# Patient Record
Sex: FEMALE | Employment: FULL TIME | URBAN - METROPOLITAN AREA
[De-identification: names, ages, dates, MRNs, and addresses within clinical notes are randomized per-mention and may not be internally consistent; named-entity substitution may affect disease eponyms.]

---

## 2020-10-28 ENCOUNTER — APPOINTMENT (OUTPATIENT)
Dept: GENERAL RADIOLOGY | Facility: CLINIC | Age: 35
End: 2020-10-28
Payer: OTHER MISCELLANEOUS

## 2020-10-28 ENCOUNTER — HOSPITAL ENCOUNTER (EMERGENCY)
Facility: CLINIC | Age: 35
Discharge: HOME | End: 2020-10-29
Attending: EMERGENCY MEDICINE | Admitting: EMERGENCY MEDICINE
Payer: OTHER MISCELLANEOUS

## 2020-10-28 DIAGNOSIS — S68.119A AMPUTATION OF TIP OF FINGER, INITIAL ENCOUNTER: ICD-10-CM

## 2020-10-28 PROCEDURE — 250N000011 HC RX IP 250 OP 636: Performed by: EMERGENCY MEDICINE

## 2020-10-28 PROCEDURE — 96375 TX/PRO/DX INJ NEW DRUG ADDON: CPT

## 2020-10-28 PROCEDURE — 99285 EMERGENCY DEPT VISIT HI MDM: CPT | Mod: 25

## 2020-10-28 PROCEDURE — 64450 NJX AA&/STRD OTHER PN/BRANCH: CPT | Mod: LT

## 2020-10-28 PROCEDURE — 73140 X-RAY EXAM OF FINGER(S): CPT | Mod: LT

## 2020-10-28 PROCEDURE — 90471 IMMUNIZATION ADMIN: CPT | Performed by: EMERGENCY MEDICINE

## 2020-10-28 PROCEDURE — 90714 TD VACC NO PRESV 7 YRS+ IM: CPT | Performed by: EMERGENCY MEDICINE

## 2020-10-28 PROCEDURE — 250N000011 HC RX IP 250 OP 636: Performed by: STUDENT IN AN ORGANIZED HEALTH CARE EDUCATION/TRAINING PROGRAM

## 2020-10-28 PROCEDURE — 96365 THER/PROPH/DIAG IV INF INIT: CPT

## 2020-10-28 RX ORDER — HYDROMORPHONE HYDROCHLORIDE 1 MG/ML
0.5 INJECTION, SOLUTION INTRAMUSCULAR; INTRAVENOUS; SUBCUTANEOUS
Status: DISCONTINUED | OUTPATIENT
Start: 2020-10-28 | End: 2020-10-28

## 2020-10-28 RX ORDER — OXYCODONE HYDROCHLORIDE 5 MG/1
5 TABLET ORAL EVERY 6 HOURS PRN
Qty: 12 TABLET | Refills: 0 | Status: SHIPPED | OUTPATIENT
Start: 2020-10-28

## 2020-10-28 RX ORDER — CEPHALEXIN 500 MG/1
500 CAPSULE ORAL 4 TIMES DAILY
Qty: 20 CAPSULE | Refills: 0 | Status: SHIPPED | OUTPATIENT
Start: 2020-10-28 | End: 2020-11-02

## 2020-10-28 RX ORDER — ONDANSETRON 4 MG/1
4 TABLET, FILM COATED ORAL EVERY 8 HOURS PRN
Qty: 6 TABLET | Refills: 0 | Status: SHIPPED | OUTPATIENT
Start: 2020-10-28

## 2020-10-28 RX ORDER — HYDROMORPHONE HYDROCHLORIDE 1 MG/ML
0.5 INJECTION, SOLUTION INTRAMUSCULAR; INTRAVENOUS; SUBCUTANEOUS ONCE
Status: COMPLETED | OUTPATIENT
Start: 2020-10-28 | End: 2020-10-28

## 2020-10-28 RX ORDER — CEFAZOLIN SODIUM 1 G/50ML
1 INJECTION, SOLUTION INTRAVENOUS ONCE
Status: COMPLETED | OUTPATIENT
Start: 2020-10-28 | End: 2020-10-29

## 2020-10-28 RX ORDER — LIDOCAINE HYDROCHLORIDE AND EPINEPHRINE 10; 10 MG/ML; UG/ML
INJECTION, SOLUTION INFILTRATION; PERINEURAL
Status: DISCONTINUED
Start: 2020-10-28 | End: 2020-10-28

## 2020-10-28 RX ADMIN — CEFAZOLIN SODIUM 1 G: 1 INJECTION, SOLUTION INTRAVENOUS at 23:11

## 2020-10-28 RX ADMIN — HYDROMORPHONE HYDROCHLORIDE 0.5 MG: 1 INJECTION, SOLUTION INTRAMUSCULAR; INTRAVENOUS; SUBCUTANEOUS at 23:11

## 2020-10-28 RX ADMIN — CLOSTRIDIUM TETANI TOXOID ANTIGEN (FORMALDEHYDE INACTIVATED) AND CORYNEBACTERIUM DIPHTHERIAE TOXOID ANTIGEN (FORMALDEHYDE INACTIVATED) 0.5 ML: 5; 2 INJECTION, SUSPENSION INTRAMUSCULAR at 23:11

## 2020-10-28 SDOH — HEALTH STABILITY: MENTAL HEALTH: HOW OFTEN DO YOU HAVE A DRINK CONTAINING ALCOHOL?: NEVER

## 2020-10-28 NOTE — ED AVS SNAPSHOT
[facility] Emergency Dept  [address]  [facility] [address]  Phone: [phone]  Fax: [phone]                                    Radha Razo   MRN: [id number]    Department: [facility] Emergency Dept   Date of Visit: 10/28/2020           After Visit Summary Signature Page    I have received my discharge instructions, and my questions have been answered. I have discussed any challenges I see with this plan with the nurse or doctor.    ..........................................................................................................................................  Patient/Patient Representative Signature      ..........................................................................................................................................  Patient Representative Print Name and Relationship to Patient    ..................................................               ................................................  Date                                   Time    ..........................................................................................................................................  Reviewed by Signature/Title    ...................................................              ..............................................  Date                                               Time          22EPIC Rev 08/18

## 2020-10-28 NOTE — LETTER
October 29, 2020      To Whom It May Concern:      Radha Razo was seen in our Emergency Department today, 10/29/20.  I expect her condition to improve over the next 3 days.  She may return to work/school when improved.    Sincerely,        Ariana Ward RN

## 2020-10-29 VITALS
SYSTOLIC BLOOD PRESSURE: 127 MMHG | TEMPERATURE: 98.1 F | OXYGEN SATURATION: 100 % | RESPIRATION RATE: 16 BRPM | HEART RATE: 16 BPM | DIASTOLIC BLOOD PRESSURE: 81 MMHG

## 2020-10-29 NOTE — ED PROVIDER NOTES
Emergency Department Attending Supervision Note  10/28/2020  10:40 PM      I evaluated this patient in conjunction with Brenda Sethi MD.      Briefly, the patient presented with a partial amputation to the distal tip of her left index finger while cutting cheese. Patient notes she is right hand-dominant. Denies any paresthesia, focal weakness, or taking any pain medications prior to arrival.  used to obtain history.    On my exam,  General: Appears uncomfortable  Head:  The scalp, face, and head appear normal  Eyes:  The pupils are normal    Conjunctivae and sclera appear normal  ENT:    The nose is normal  Neck:  Normal range of motion  Skin:  No rash or lesions noted.  Neuro: Speech is normal and fluent  Psych: Awake. Alert.  Normal affect.    MSK:  L. Hand:    L. 2nd digit with distal amputation, bone visualized. Bleeding controlled with direct pressure.  The finger flexors (FDS/FDP) are intact    The finger extensors are intact    The thumb exam is normal, including:    Adduction, abduction, flexion, extension, opposition    There are no sensory deficits    Median, Ulnar, and Radial nerve function is normal    Radial artery pulsations are normal    Capillary refill is normal          Procedure:   Performed by: Dr. Maryana Locke  Site: L. 2nd digit  Digital block with 1% lidocaine, 1cc given  Patient tolerated procedure without complication    Results:  Imaging:  XR Fingers, Left, G/E 2-3 views:   The tip of the index finger is only visualized on the lateral projection due to its flexed orientation. The soft tissues of the pointer finger do appear to be slightly truncated/amputated but the underlying bone remains intact. No radiopaque foreign bodies.     Remainder the hand appears negative. As per radiology.     Interventions:  2311 Ancef 1 g IV  2311 Dilaudid 0.5 mg IV  2311 Tdap 0.5 mL IM    ED course:  Nursing notes and vitals reviewed. 2241 I performed an exam of the patient as documented  above. Brenda performed a digital block to her finger, see her note for further details.     The patient was sent for a left finger x-ray while in the emergency department, findings above.     Findings and plan explained to the Patient. Patient discharged home with instructions regarding supportive care, medications, and reasons to return. The importance of close follow-up was reviewed. The patient was prescribed Keflex, Zofran, and Oxycodone.    I personally reviewed the imaging results with the Patient and answered all related questions prior to discharge.     Radha Razo is a 35 year old female presented with a partial fingertip avulsion.  The wound was carefully evaluated and explored.  It is unfortunately not amenable to repair as there is no excess tissue.  There is no sign of tuft fracture, neurovascular or tendinous injury.  A digital block was performed with good pain relief.  Tetanus was updated here.  Bleeding was controlled with gel foam.  The area was dressed and splinted with cessation of bleeding. Risk of infection, scarring and cosmesis effects discussed with patient. Dr. Wick, hand surgery at [facility], was consulted with plan to follow up tomorrow for reevaluation as the patient may require skin grafting or other surgical correction.  Scripts for prophylactic antibiotics and pain meds were provided.   I also discussed signs of infection including redness, warmth, foul-smelling drainage and worsening pain and instructed the patient to return promptly to the ER for re-evaluation should any of these develop.    Diagnosis    ICD-10-CM    1. Amputation of tip of finger, initial encounter  S68.119A        Scribe Disclosure:  Claudine LE, am serving as a scribe on 10/28/2020 at 10:21 PM to personally document services performed by Maryana Locke DO based on my observations and the provider's statements to me.              Maryana Locke DO  10/29/20 [id number]

## 2020-10-29 NOTE — ED PROVIDER NOTES
History     Chief Complaint:  Laceration    HPI  Radha Razo is a 35 year old Danish-speaking, R-handed female who presents with injury to her L index finger. History obtained through assistance of telephone . Radha reports that immediate prior to presentation she was at work, using a large knife to cut cheese. While cutting she cut her L index finger. It is very painful and bled profusely and she bandaged it at work. Bleeding seems to have slowed now. She is not sure of her last tetanus shot.    Allergies:  No Known Drug Allergies    Medications:   The patient is not currently taking any prescribed medications.    Medical History:   The patient denies any significant past medical history.    Surgical History   The patient does not have any pertinent past surgical history.    Family History:   No past pertinent family history.    Social History:  Smoking status: Negative  Alcohol use: Negative  Drug use: Negative  Marital Status: Single  Presents to the ED alone.     Review of Systems  Skin: negative  Eyes: negative  Ears/Nose/Throat: negative  Respiratory: No shortness of breath, dyspnea on exertion, cough, or hemoptysis  Cardiovascular: negative  Gastrointestinal: negative  Genitourinary: negative  Musculoskeletal: Positive for L index finger injury  Neurologic: negative  Psychiatric: negative  Hematologic/Lymphatic/Immunologic: negative  Endocrine: negative    Physical Exam     Patient Vitals for the past 24 hrs:   BP Temp Temp src Pulse Resp SpO2   10/28/20 2215 127/81 -- -- 87 -- 100 %   10/28/20 2110 124/74 98.1  F (36.7  C) Temporal 104 16 99 %       Physical Exam  GEN: Alert, oriented person in NAD  HENT: normocephalic, atraumatic  NECK: full ROM  RESPIRATORY: no respiratory distress, no extra WOB, speaking in full sentences  CVS: regular rate, WWP  MSK: R index finger with distal tip amputated, bone visible. Venous bleeding.  NEURO: no FND.     Emergency Department Course    Imaging:  Radiology findings were communicated with the patient who voiced understanding of the findings.    XR Fingers, Left, G/E 2-3 views:   The tip of the index finger is only visualized on the lateral projection due to its flexed orientation. The soft tissues of the pointer finger do appear to be slightly truncated/amputated but the underlying bone remains intact. No radiopaque foreign bodies. Remainder the hand appears negative. As per radiology.     Procedures    Digital Block   LOCATION:  Left first finger    ANESTHESIA: Digital block using 1% Lidocaine without epi, total of 1.5 mL    PROCEDURE NOTE: The base of the digit was cleaned with iodine swabs. 27 g needle used, nerve area infiltrated. The patient tolerated the procedure well with good relief of discomfort and there were no complications.    Interventions:  2311 Ancef 1 g IV  2311 Dilaudid 0.5 mg IV  2311 Tdap 0.5 mL IM    Emergency Department Course:  Past medical records, nursing notes, and vitals reviewed.    10:38 PM I performed an exam of the patient as documented above.     The patient was sent for a left finger x-ray while in the emergency department, results above.     2340 I rechecked the patient and discussed the results of her workup thus far.     2346 I consulted with Dr. Wick, hand surgery, regarding the patient's history and presentation here in the emergency department. Plan in place for hand clinic to call patient on 10/29 in the AM to coordinate follow-up.     Findings and plan explained to the Patient. Patient discharged home with instructions regarding supportive care, medications, and reasons to return. The importance of close follow-up was reviewed. The patient was prescribed Keflex, Zofran, and Oxycodone.    I personally reviewed the imaging results with the Patient and answered all related questions prior to discharge.     Impression & Plan   Medical Decision Making:  Radha Razo is a 35 year old female who presents  today for distal L index fingertip amputation. Imaging indicates that bone is intact. Digital nerve block completed with improvement in pain control. Given visualization of bone on exam, one dose of prophylactic ancef was given in the ED. Case was discussed with on-call orthopedic surgeon, Dr. Wick, with plan in place for hand clinic to call patient on 10/29 to coordinate follow-up. Patient was discharged home with gel foam dressing over wound, 5-day course of keflex, short course of pain medications and antiemetics. Plan for follow-up was discussed with patient and return precautions were given. Patient endorsed understanding and agreement with plan, all questions were answered.    Diagnosis:    ICD-10-CM    1. Amputation of tip of finger, initial encounter  S68.119A        Disposition:  Discharged to home.    Discharge Medications:  New Prescriptions    CEPHALEXIN (KEFLEX) 500 MG CAPSULE    Take 1 capsule (500 mg) by mouth 4 times daily for 5 days    ONDANSETRON (ZOFRAN) 4 MG TABLET    Take 1 tablet (4 mg) by mouth every 8 hours as needed for nausea    OXYCODONE (ROXICODONE) 5 MG TABLET    Take 1 tablet (5 mg) by mouth every 6 hours as needed for pain       Brenda Burton MD  PGY-2, [facility]     Scribe Disclosure:  I, Claudine Samaniego, am serving as a scribe on 10/28/2020 at 10:38 PM to personally document services performed by Brenda Sethi MD, based on my observations and the provider's statements to me.      Brenda Sethi MD  Resident  10/29/20 0018

## 2020-10-29 NOTE — DISCHARGE INSTRUCTIONS
You will need to see the orthopedics doctors tomorrow, 10/29, to follow-up your finger amputation. The orthopedics doctors will call you in the morning to set up an appointment. Expect a call at 0830, if you do not hear from them, call the number on the card you are being provided.     We are prescribing you antibiotics, pain medications and nausea medicine (the pain medication can make some people nauseas).